# Patient Record
Sex: MALE | Race: WHITE | NOT HISPANIC OR LATINO | Employment: FULL TIME | URBAN - METROPOLITAN AREA
[De-identification: names, ages, dates, MRNs, and addresses within clinical notes are randomized per-mention and may not be internally consistent; named-entity substitution may affect disease eponyms.]

---

## 2019-03-28 ENCOUNTER — OFFICE VISIT (OUTPATIENT)
Dept: URGENT CARE | Facility: PHYSICIAN GROUP | Age: 34
End: 2019-03-28
Payer: COMMERCIAL

## 2019-03-28 VITALS
RESPIRATION RATE: 13 BRPM | TEMPERATURE: 98.4 F | HEIGHT: 71 IN | BODY MASS INDEX: 30.1 KG/M2 | SYSTOLIC BLOOD PRESSURE: 104 MMHG | WEIGHT: 215 LBS | HEART RATE: 82 BPM | DIASTOLIC BLOOD PRESSURE: 70 MMHG | OXYGEN SATURATION: 98 %

## 2019-03-28 DIAGNOSIS — R10.13 EPIGASTRIC PAIN: ICD-10-CM

## 2019-03-28 PROCEDURE — 99203 OFFICE O/P NEW LOW 30 MIN: CPT | Performed by: PHYSICIAN ASSISTANT

## 2019-03-28 RX ORDER — SUCRALFATE 1 G/1
1 TABLET ORAL
Qty: 45 TAB | Refills: 0 | Status: SHIPPED | OUTPATIENT
Start: 2019-03-28

## 2019-03-28 ASSESSMENT — ENCOUNTER SYMPTOMS
WEAKNESS: 0
FEVER: 0
MUSCULOSKELETAL NEGATIVE: 1
EXERTIONAL CHEST PRESSURE: 0
NAUSEA: 0
LEG PAIN: 0
CONSTIPATION: 0
BLOOD IN STOOL: 0
HEARTBURN: 1
COUGH: 0
PALPITATIONS: 0
RESPIRATORY NEGATIVE: 1
DIARRHEA: 0
IRREGULAR HEARTBEAT: 0
ABDOMINAL PAIN: 0
NEUROLOGICAL NEGATIVE: 1
NUMBNESS: 0
VOMITING: 0
SHORTNESS OF BREATH: 0
CHILLS: 0

## 2019-03-28 ASSESSMENT — PAIN SCALES - GENERAL: PAINLEVEL: 2=MINIMAL-SLIGHT

## 2019-03-28 NOTE — PROGRESS NOTES
"Subjective:      Channing Lizama is a 33 y.o. male who presents with Chest Pain (Pressure, swelling x 2-3 weeks)            Epigastric pain, swelling, tenderness.  Associated ingestion reflux.  Worse after he eats.  No chest pain or palpitations.  No shortness of breath or cough.      Chest Pain    This is a new problem. The current episode started 1 to 4 weeks ago. The onset quality is sudden. The problem occurs daily. The problem has been rapidly improving. The pain is present in the epigastric region. Pertinent negatives include no abdominal pain, cough, exertional chest pressure, fever, irregular heartbeat, leg pain, nausea, numbness, palpitations, shortness of breath, vomiting or weakness. He has tried nothing for the symptoms.   Pertinent negatives for past medical history include no PE.         PMH:  has no past medical history on file.  MEDS: No current outpatient prescriptions on file.  ALLERGIES: No Known Allergies  SURGHX: No past surgical history on file.  SOCHX:  reports that he has never smoked. He has never used smokeless tobacco. He reports that he uses drugs, including Marijuana.  FH: family history is not on file.    Review of Systems   Constitutional: Negative for chills and fever.   HENT: Negative.    Respiratory: Negative.  Negative for cough and shortness of breath.    Cardiovascular: Positive for chest pain. Negative for palpitations and leg swelling.   Gastrointestinal: Positive for heartburn. Negative for abdominal pain, blood in stool, constipation, diarrhea, melena, nausea and vomiting.   Musculoskeletal: Negative.    Neurological: Negative.  Negative for weakness and numbness.       Medications, Allergies, and current problem list reviewed today in Epic     Objective:     /70   Pulse 82   Temp 36.9 °C (98.4 °F)   Resp 13   Ht 1.803 m (5' 11\")   Wt 97.5 kg (215 lb)   SpO2 98%   BMI 29.99 kg/m²      Physical Exam   Constitutional: He is oriented to person, place, and time. " He appears well-developed and well-nourished. No distress.   HENT:   Head: Normocephalic and atraumatic.   Right Ear: External ear normal.   Left Ear: External ear normal.   Nose: Nose normal.   Mouth/Throat: Oropharynx is clear and moist. No oropharyngeal exudate.   Eyes: Pupils are equal, round, and reactive to light. Conjunctivae and EOM are normal.   Neck: Normal range of motion. Neck supple.   Cardiovascular: Normal rate, regular rhythm and normal heart sounds.    No murmur heard.  Pulmonary/Chest: Effort normal and breath sounds normal. No respiratory distress. He has no wheezes. He has no rales. He exhibits no tenderness.   Abdominal: Soft. Normal appearance and bowel sounds are normal. He exhibits no distension. There is tenderness in the epigastric area. There is no rigidity, no rebound, no guarding, no CVA tenderness, no tenderness at McBurney's point and negative Prather's sign.       Neurological: He is alert and oriented to person, place, and time.   Skin: Skin is warm and dry. He is not diaphoretic.   Psychiatric: He has a normal mood and affect. His behavior is normal. Judgment and thought content normal.   Nursing note and vitals reviewed.              Assessment/Plan:     1. Epigastric pain  sucralfate (CARAFATE) 1 GM Tab     Epigastric tenderness with subjective swelling.  Associated indigestion, worse after he eats.  Denies chest pain, palpitations, shortness of breath cough, vision changes, headache, dizziness.  No signs of cardiac or pulmonary etiology.  OTC meds and conservative measures as discussed  Return to clinic or go to ED if symptoms worsen or persist. Indications for ED discussed at length. Patient voices understanding. Follow-up with your primary care provider in 3-5 days. Red flags discussed. All side effects of medication discussed including allergic response, GI upset, tendon injury, etc.    Please note that this dictation was created using voice recognition software. I have made  every reasonable attempt to correct obvious errors, but I expect that there are errors of grammar and possibly content that I did not discover before finalizing the note.

## 2019-03-28 NOTE — LETTER
March 28, 2019         Patient: Channing Lizama   YOB: 1985   Date of Visit: 3/28/2019           To Whom it May Concern:    Channing Lizama was seen in my clinic on 3/28/2019. He may return to work on Fri. March 29th.    If you have any questions or concerns, please don't hesitate to call.        Sincerely,           Kevin Delgado P.A.-C.  Electronically Signed